# Patient Record
Sex: FEMALE | Race: WHITE | Employment: UNEMPLOYED | URBAN - METROPOLITAN AREA
[De-identification: names, ages, dates, MRNs, and addresses within clinical notes are randomized per-mention and may not be internally consistent; named-entity substitution may affect disease eponyms.]

---

## 2017-06-19 ENCOUNTER — TELEPHONE (OUTPATIENT)
Dept: TRANSPLANT | Facility: CLINIC | Age: 67
End: 2017-06-19

## 2017-06-19 NOTE — TELEPHONE ENCOUNTER
Call from Patient:  - Newly diagnosed with type 2 diabetes  - Primary care physician wants to start an ACE or an ARB, requests recommendation    Recommendation:  - OK to start lisinopril or losartan  - Check BMP 1-2 weeks after ACE/ARB started (monitor for increased creatinine, hyperkalemia)

## 2019-10-05 ENCOUNTER — HEALTH MAINTENANCE LETTER (OUTPATIENT)
Age: 69
End: 2019-10-05

## 2020-02-10 ENCOUNTER — HEALTH MAINTENANCE LETTER (OUTPATIENT)
Age: 70
End: 2020-02-10

## 2020-11-14 ENCOUNTER — HEALTH MAINTENANCE LETTER (OUTPATIENT)
Age: 70
End: 2020-11-14

## 2021-04-03 ENCOUNTER — HEALTH MAINTENANCE LETTER (OUTPATIENT)
Age: 71
End: 2021-04-03

## 2021-09-12 ENCOUNTER — HEALTH MAINTENANCE LETTER (OUTPATIENT)
Age: 71
End: 2021-09-12

## 2021-11-07 ENCOUNTER — HEALTH MAINTENANCE LETTER (OUTPATIENT)
Age: 71
End: 2021-11-07

## 2022-01-01 ENCOUNTER — HEALTH MAINTENANCE LETTER (OUTPATIENT)
Age: 72
End: 2022-01-01

## 2022-01-26 ENCOUNTER — TELEPHONE (OUTPATIENT)
Dept: TRANSPLANT | Facility: CLINIC | Age: 72
End: 2022-01-26

## 2022-01-26 NOTE — TELEPHONE ENCOUNTER
Pt wants advice if she can stop Prednisone 5 mg  Feels like it is affecting her BS  Also has not received the booster vaccine  Needs to review what we are telling the transplant pts

## 2022-01-26 NOTE — TELEPHONE ENCOUNTER
RNCC returning call to patient.  Explained to patient that we are recommending the 4th vaccine to our patients.  Instructed her to look at cdc guidelines regarding timing of the vaccine or cb with any further questions.    Audra Grossman RN   Transplant Coordinator  [phone]

## 2023-06-01 ENCOUNTER — HEALTH MAINTENANCE LETTER (OUTPATIENT)
Age: 73
End: 2023-06-01

## 2023-10-03 ENCOUNTER — POST MORTEM DOCUMENTATION (OUTPATIENT)
Dept: TRANSPLANT | Facility: CLINIC | Age: 73
End: 2023-10-03
Payer: MEDICARE

## 2023-10-03 NOTE — PROGRESS NOTES
Received notification on 10/3/2023 at 1530 of patient's death from unknown, contact information is from clinical research coordinator.  Place of death was reported as unknown.  Graft status at the time of death was reported as Unknown.  Additional information: Connie's donor was talking with the clinical research coordinator today and found out that Connie had .  TIS verification is: Pending  The Transplant Office has been notified that patient is . The Post Mortem Encounter has been completed. Notifications have been sent to the Care team and Social Work.   Instructions have been sent to cancel pending appointments, discontinue pending orders, eliminate paper chart, and send a sympathy card to the family.    LEV [facility]  Received notification patient's death from clinical research coordinator.  Place of death was reported as unknown.  Graft status at the time of death was reported as Unknown.  Additional information: Exhausted all resources for finding cause of death information. COD will be reported as unknown'  TIS verification is: Complete

## 2023-10-04 ENCOUNTER — DOCUMENTATION ONLY (OUTPATIENT)
Dept: TRANSPLANT | Facility: CLINIC | Age: 73
End: 2023-10-04
Payer: MEDICARE